# Patient Record
Sex: MALE | Race: WHITE
[De-identification: names, ages, dates, MRNs, and addresses within clinical notes are randomized per-mention and may not be internally consistent; named-entity substitution may affect disease eponyms.]

---

## 2019-10-19 ENCOUNTER — HOSPITAL ENCOUNTER (EMERGENCY)
Dept: HOSPITAL 47 - EC | Age: 44
Discharge: HOME | End: 2019-10-19
Payer: SELF-PAY

## 2019-10-19 VITALS
SYSTOLIC BLOOD PRESSURE: 122 MMHG | HEART RATE: 66 BPM | RESPIRATION RATE: 16 BRPM | TEMPERATURE: 98 F | DIASTOLIC BLOOD PRESSURE: 83 MMHG

## 2019-10-19 DIAGNOSIS — Z79.899: ICD-10-CM

## 2019-10-19 DIAGNOSIS — T15.92XA: Primary | ICD-10-CM

## 2019-10-19 DIAGNOSIS — F32.9: ICD-10-CM

## 2019-10-19 PROCEDURE — 99283 EMERGENCY DEPT VISIT LOW MDM: CPT

## 2019-10-19 PROCEDURE — 65205 REMOVE FOREIGN BODY FROM EYE: CPT

## 2019-10-19 NOTE — ED
Eye Problem HPI





- General


Chief complaint: Eye Problems


Stated complaint: FB in eye


Time Seen by Provider: 10/19/19 09:30


Source: patient, RN notes reviewed


Mode of arrival: ambulatory


Limitations: no limitations





- History of Present Illness


Initial comments: 





43-year-old male presents emergency Department with chief complaint of left eye 

irritation.  Patient states she is cleaning yesterday and states that he had 

something in his left eye.  His tetanus is up-to-date last 5 years.  He states 

he tried to flush his eye woke up and stilts had some discomfort.  Denies any 

blurred vision, double vision.  Patient denies any other complaints at this 

time.





- Related Data


                                Home Medications











 Medication  Instructions  Recorded  Confirmed


 


PARoxetine HCL [Paxil] 30 mg PO DAILY 08/29/16 08/29/16








                                  Previous Rx's











 Medication  Instructions  Recorded


 


Ondansetron Odt [Zofran Odt] 4 mg PO Q8HR PRN #10 tab 12/05/17











                                    Allergies











Allergy/AdvReac Type Severity Reaction Status Date / Time


 


No Known Allergies Allergy   Verified 10/19/19 09:27














Review of Systems


ROS Statement: 


Those systems with pertinent positive or pertinent negative responses have been 

documented in the HPI.





ROS Other: All systems not noted in ROS Statement are negative.





Past Medical History


Past Medical History: No Reported History


Additional Past Medical History / Comment(s): SVT


History of Any Multi-Drug Resistant Organisms: None Reported


Past Surgical History: Ablation


Past Psychological History: Depression


Smoking Status: Never smoker


Past Alcohol Use History: None Reported


Past Drug Use History: None Reported





General Exam


Limitations: no limitations


General appearance: alert, in no apparent distress


Head exam: Present: atraumatic, normocephalic, normal inspection


Eye exam: Present: PERRL, EOMI.  Absent: normal appearance (Foreign body noted 

in the 7 o'clock position), scleral icterus, conjunctival injection, periorbital

swelling


ENT exam: Present: normal exam, normal oropharynx, mucous membranes moist


Neck exam: Present: normal inspection, full ROM.  Absent: tenderness, 

meningismus, lymphadenopathy


Respiratory exam: Present: normal lung sounds bilaterally.  Absent: respiratory 

distress, wheezes, rales, rhonchi, stridor


Cardiovascular Exam: Present: regular rate, normal rhythm, normal heart sounds. 

Absent: systolic murmur, diastolic murmur, rubs, gallop, clicks


Neurological exam: Present: alert, oriented X3


Skin exam: Present: warm, dry, intact, normal color.  Absent: rash





Course


                                   Vital Signs











  10/19/19





  09:26


 


Temperature 98.0 F


 


Pulse Rate 66


 


Respiratory 16





Rate 


 


Blood Pressure 122/83


 


O2 Sat by Pulse 99





Oximetry 














Procedures





- Procedures


Initial comment: 





Left eye foreign body removal, 2 drops of proparacaine were used to anesthetize 

eye for relief of symptoms, stroke in Q-tip was used to remove the foreign body 

with no compilations fluorescein dye were used there is uptake in a region or 

foreign body was removed otherwise no uptake





Disposition


Clinical Impression: 


 Foreign body of left eye





Disposition: HOME SELF-CARE


Condition: Stable


Instructions (If sedation given, give patient instructions):  Eye Foreign Body 

(ED)


Additional Instructions: 


Please return to the Emergency Department if symptoms worsen or any other 

concerns.  Use Tobrex eyedrops 1 drop to left eye every 4 hours for 7 days


Is patient prescribed a controlled substance at d/c from ED?: No


Referrals: 


Usman West MD [STAFF PHYSICIAN] - 1-2 days


Time of Disposition: 09:51

## 2020-02-06 ENCOUNTER — HOSPITAL ENCOUNTER (EMERGENCY)
Dept: HOSPITAL 47 - EC | Age: 45
Discharge: HOME | End: 2020-02-06
Payer: COMMERCIAL

## 2020-02-06 VITALS
SYSTOLIC BLOOD PRESSURE: 149 MMHG | TEMPERATURE: 98.2 F | RESPIRATION RATE: 20 BRPM | HEART RATE: 88 BPM | DIASTOLIC BLOOD PRESSURE: 89 MMHG

## 2020-02-06 DIAGNOSIS — F43.21: Primary | ICD-10-CM

## 2020-02-06 DIAGNOSIS — Z79.899: ICD-10-CM

## 2020-02-06 DIAGNOSIS — F41.9: ICD-10-CM

## 2020-02-06 PROCEDURE — 82075 ASSAY OF BREATH ETHANOL: CPT

## 2020-02-06 PROCEDURE — 99284 EMERGENCY DEPT VISIT MOD MDM: CPT

## 2020-02-06 NOTE — ED
General Adult HPI





- General


Chief complaint: Psychiatric Symptoms


Stated complaint: Depression


Time Seen by Provider: 02/06/20 17:44


Source: patient, RN notes reviewed, old records reviewed


Mode of arrival: ambulatory


Limitations: no limitations





- History of Present Illness


Initial comments: 





Patient is a 44 rolled male, who presents emergency department today with 

suicidal ideation suppression.  States he sees his primary care doctor who 

manages him on mood stabilizers.  Does not see a psychiatrist or counselor at 

this time.  He states like just is not worth living anymore.  Will not give 

further detail.  Denies any recent suicide attempts including overdoses or 

inflicted self-harm.





- Related Data


                                Home Medications











 Medication  Instructions  Recorded  Confirmed


 


PARoxetine HCL [Paxil] 30 mg PO DAILY 08/29/16 08/29/16








                                  Previous Rx's











 Medication  Instructions  Recorded


 


Ondansetron Odt [Zofran Odt] 4 mg PO Q8HR PRN #10 tab 12/05/17











                                    Allergies











Allergy/AdvReac Type Severity Reaction Status Date / Time


 


No Known Allergies Allergy   Verified 02/06/20 17:22














Review of Systems


ROS Statement: 


Those systems with pertinent positive or pertinent negative responses have been 

documented in the HPI.





ROS Other: All systems not noted in ROS Statement are negative.





Past Medical History


Past Medical History: No Reported History


Additional Past Medical History / Comment(s): SVT


History of Any Multi-Drug Resistant Organisms: None Reported


Past Surgical History: Ablation


Past Psychological History: Anxiety, Depression


Smoking Status: Never smoker


Past Alcohol Use History: None Reported


Past Drug Use History: None Reported





General Exam





- General Exam Comments


Initial Comments: 





44-year-old male.  Alert and oriented 3.  Patient appears in no significant 

distress.


Limitations: no limitations


General appearance: alert, in no apparent distress


Head exam: Present: atraumatic, normocephalic, normal inspection


Eye exam: Present: normal appearance, PERRL, EOMI.  Absent: scleral icterus, 

conjunctival injection, periorbital swelling


ENT exam: Present: normal exam, mucous membranes moist


Neck exam: Present: normal inspection


Respiratory exam: Present: normal lung sounds bilaterally.  Absent: respiratory 

distress, wheezes, rales, rhonchi, stridor


Cardiovascular Exam: Present: regular rate, normal rhythm, normal heart sounds. 

Absent: systolic murmur, diastolic murmur, rubs, gallop, clicks


Extremities exam: Present: normal inspection, full ROM, normal capillary refill.

 Absent: tenderness, pedal edema, joint swelling, calf tenderness


Back exam: Present: normal inspection


Neurological exam: Present: alert, oriented X3, CN II-XII intact


Psychiatric exam: Present: other (Patient appears somewhat angry, and hostile. 

Non talkative. ).  Absent: normal affect, normal mood


Skin exam: Present: warm, dry, intact, normal color.  Absent: rash





Course


                                   Vital Signs











  02/06/20 02/06/20 02/06/20





  17:19 17:22 18:22


 


Temperature 98.2 F  


 


Pulse Rate 88  


 


Respiratory 20 20 20





Rate   


 


Blood Pressure 149/89  


 


O2 Sat by Pulse 99  





Oximetry   














Medical Decision Making





- Medical Decision Making





44-year-old male presents with finger, and reports that he "has not until 4".  

He would not tell me any full details and denied any specific suicidal plans to 

me.  The Patient is medically clear for EPS evaluation.  Patient was evaluated 

by EPS nurse thought to have an extensive conversation with Patient.  She was 

informed the Patient underwent a divorce and has been having some trouble 

maintaining contact with his daughters and nonbiological son whom he still is 

wanting to  remain in contact with.  Patient is maintained on Effexor.  After 

EPS evaluation, Patient at this time does agree to safety plan and will be 

discharged home.  Patient is given outpatient resources.





Disposition


Clinical Impression: 


 Situational depression





Disposition: HOME SELF-CARE


Condition: Good


Instructions (If sedation given, give patient instructions):  Depression (ED), 

Suicide Prevention (ED)


Additional Instructions: 


Patient advised to follow up with outpatient resources.  Recommended discussing 

with primary care physician as well.  Return to the emergency department if any 

alarming signs or symptoms occur.


Is patient prescribed a controlled substance at d/c from ED?: No


Referrals: 


Bert Coker MD [Primary Care Provider] - 1-2 days


Time of Disposition: 19:45

## 2020-05-01 ENCOUNTER — HOSPITAL ENCOUNTER (EMERGENCY)
Dept: HOSPITAL 47 - EC | Age: 45
Discharge: HOME | End: 2020-05-01
Payer: COMMERCIAL

## 2020-05-01 VITALS
DIASTOLIC BLOOD PRESSURE: 89 MMHG | SYSTOLIC BLOOD PRESSURE: 136 MMHG | HEART RATE: 102 BPM | TEMPERATURE: 99 F | RESPIRATION RATE: 18 BRPM
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
## 2020-05-01 DIAGNOSIS — Z23: ICD-10-CM

## 2020-05-01 DIAGNOSIS — F41.9: ICD-10-CM

## 2020-05-01 DIAGNOSIS — Z79.899: ICD-10-CM

## 2020-05-01 DIAGNOSIS — F32.9: ICD-10-CM
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
## 2020-05-01 DIAGNOSIS — S61.211A: Primary | ICD-10-CM

## 2020-05-01 PROCEDURE — 73120 X-RAY EXAM OF HAND: CPT

## 2020-05-01 PROCEDURE — 99283 EMERGENCY DEPT VISIT LOW MDM: CPT

## 2020-05-01 PROCEDURE — 12001 RPR S/N/AX/GEN/TRNK 2.5CM/<: CPT

## 2020-05-01 PROCEDURE — 90471 IMMUNIZATION ADMIN: CPT

## 2020-05-01 PROCEDURE — 90715 TDAP VACCINE 7 YRS/> IM: CPT

## 2020-05-01 NOTE — XR
EXAMINATION TYPE: XR hand limited LT, 2 views

 

DATE OF EXAM: 5/1/2020

 

COMPARISON: NONE

 

HISTORY: 44-year-old male index finger laceration, pain

 

TECHNIQUE: 2 views

 

FINDINGS: There are small defect involving the radial margin of the second distal phalangeal tuft on 
the AP view. No retained radiopaque foreign body or otherwise any other fracture or dislocation seen.


 

IMPRESSION: 

Small defect involving the radial margin of the second distal phalangeal tuft on the AP view, possibl
e traumatic defect. In the absence of penetrating injury, congenital variation would be suggested. Cl
inically correlate. No retained radiopaque foreign body seen.

## 2020-05-01 NOTE — ED
Wound/Laceration HPI





- General


Chief Complaint: Wound/Laceration


Stated Complaint: Finger laceration


Time Seen by Provider: 05/01/20 18:46


Source: patient


Mode of arrival: ambulatory


Limitations: no limitations





- History of Present Illness


Initial Comments: 





Patient is a 44-year-old male presenting to the emergency Department with 

complaints of a laceration to his left index finger.  Patient states he was 

using a drill when it slipped and he cut his finger.  He denies being on blood 

thinners.  Bleeding is controlled with a bandage at this time.  Patient did put 

some sort of skin glue on top of the injury prior to arrival.  He does not 

remember his last tetanus vaccine.  Denies fever or chills.  He has no other 

complaints at this time.





- Related Data


                                Home Medications











 Medication  Instructions  Recorded  Confirmed


 


PARoxetine HCL [Paxil] 30 mg PO DAILY 08/29/16 08/29/16








                                  Previous Rx's











 Medication  Instructions  Recorded


 


Ondansetron Odt [Zofran Odt] 4 mg PO Q8HR PRN #10 tab 12/05/17


 


Cephalexin [Keflex] 500 mg PO BID 3 Days #6 cap 05/01/20











                                    Allergies











Allergy/AdvReac Type Severity Reaction Status Date / Time


 


No Known Allergies Allergy   Verified 05/01/20 18:45














Review of Systems


ROS Statement: 


Those systems with pertinent positive or pertinent negative responses have been 

documented in the HPI.





ROS Other: All systems not noted in ROS Statement are negative.





Past Medical History


Past Medical History: No Reported History


Additional Past Medical History / Comment(s): SVT


History of Any Multi-Drug Resistant Organisms: None Reported


Past Surgical History: Ablation


Past Psychological History: Anxiety, Depression


Smoking Status: Never smoker


Past Alcohol Use History: None Reported


Past Drug Use History: None Reported





General Exam





- General Exam Comments


Initial Comments: 





GENERAL: 


Well-appearing, well-nourished and in no acute distress.





HEAD: 


Atraumatic, normocephalic.





EYES:


Pupils equal round and reactive to light, extraocular movements intact, sclera 

anicteric, conjunctiva are normal.





ENT: 


Moist mucous membranes.





NECK: 


Normal range of motion, supple without lymphadenopathy or JVD.





LUNGS:


 Breath sounds clear to auscultation bilaterally and equal.  No wheezes rales or

 rhonchi.





HEART:


Regular rate and rhythm without murmurs, rubs or gallops.





ABDOMEN: 


Soft, nontender, normoactive bowel sounds.  No guarding, no rebound.  No masses 

appreciated.





: Deferred 





EXTREMITIES: 


Patient has full range of motion of the left hand and fingers.  Normal range of 

motion, no pitting or edema.  No clubbing or cyanosis.





NEUROLOGICAL: 


Normal speech, normal gait.





PSYCH:


Normal mood, normal affect.





SKIN:


 Warm, Dry, normal turgor, no rashes.  Patient has a 1 cm laceration to the 

palmar aspect of the distal left index finger.  There is no nail involvement.  

Bleeding is controlled.


Limitations: no limitations





Course


                                   Vital Signs











  05/01/20 05/01/20 05/01/20





  18:42 19:10 19:42


 


Temperature 98.2 F  99.0 F


 


Pulse Rate 120 H  102 H


 


Respiratory 20 20 18





Rate   


 


Blood Pressure 134/85  136/89


 


O2 Sat by Pulse 95  98





Oximetry   














Procedures





- Laceration


  ** Laceration #1


Consent Obtained: verbal consent


Indication: laceration


Site: other (Left index finger, distal portion)


Size (cm): 1


Description: linear, irregular


Depth: simple, single layer


Anesthetic Used: lidocaine 1%


Anesthesia Technique: local infiltration


Amount (mls): 2


Pre-repair: irrigated extensively


Type of Sutures: nylon


Size of Sutures: 5-0


Number of Sutures: 2


Technique: simple, interrupted


Patient Tolerated Procedure: well





Medical Decision Making





- Medical Decision Making





Patient is a 44-year-old male with a laceration to his left index finger.  

Tetanus vaccine is updated today.  X-ray reveals a small defect on the second 

distal tuft, possibly congenital.  Patient stated he had previous injury to same

 finger.  Patient's wound was irrigated, closed with 2, 5-0 sutures.  Patient 

tolerated procedure well.  Patient will have stitches removed in 7-10 days.  He 

is stable for discharge.  Patient was placed on antibiotics.  He is agreement 

with this plan and care.  Return parameters were discussed with the patient and 

he verbalized understanding.  Case discussed with Dr. Walters.





Disposition


Clinical Impression: 


 Laceration of left index finger w/o foreign body w/o damage to nail





Disposition: HOME SELF-CARE


Condition: Stable


Instructions (If sedation given, give patient instructions):  Care For Your 

Stitches (ED)


Additional Instructions: 


Please return to the Emergency Department if symptoms worsen or any other 

concerns.


Sutures need to be removed in 7-10 days.  


Keep wound clean and dry.  Cover when working.  


Take antibiotic as prescribed.


Prescriptions: 


Cephalexin [Keflex] 500 mg PO BID 3 Days #6 cap


Is patient prescribed a controlled substance at d/c from ED?: No


Referrals: 


Bert Coker MD [Primary Care Provider] - 1-2 days

## 2020-09-28 ENCOUNTER — HOSPITAL ENCOUNTER (EMERGENCY)
Dept: HOSPITAL 47 - EC | Age: 45
Discharge: OTHER INSTITUTION | End: 2020-09-28
Payer: COMMERCIAL

## 2020-09-28 VITALS
DIASTOLIC BLOOD PRESSURE: 72 MMHG | TEMPERATURE: 97 F | SYSTOLIC BLOOD PRESSURE: 136 MMHG | RESPIRATION RATE: 32 BRPM | HEART RATE: 94 BPM

## 2020-09-28 DIAGNOSIS — R06.83: ICD-10-CM

## 2020-09-28 DIAGNOSIS — S60.511A: ICD-10-CM

## 2020-09-28 DIAGNOSIS — S60.512A: ICD-10-CM

## 2020-09-28 DIAGNOSIS — R40.2430: ICD-10-CM

## 2020-09-28 DIAGNOSIS — S01.01XA: Primary | ICD-10-CM

## 2020-09-28 DIAGNOSIS — Z23: ICD-10-CM
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
## 2020-09-28 DIAGNOSIS — S30.1XXA: ICD-10-CM

## 2020-09-28 DIAGNOSIS — S70.212A: ICD-10-CM
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
## 2020-09-28 DIAGNOSIS — S70.11XA: ICD-10-CM

## 2020-09-28 LAB
ALBUMIN SERPL-MCNC: 5.3 G/DL (ref 3.5–5)
ALP SERPL-CCNC: 79 U/L (ref 38–126)
ALT SERPL-CCNC: 45 U/L (ref 4–49)
AMYLASE SERPL-CCNC: 152 U/L (ref 30–110)
ANION GAP SERPL CALC-SCNC: 23 MMOL/L
APTT BLD: 22.1 SEC (ref 22–30)
AST SERPL-CCNC: 46 U/L (ref 17–59)
BASOPHILS # BLD AUTO: 0 K/UL (ref 0–0.2)
BASOPHILS NFR BLD AUTO: 0 %
BUN SERPL-SCNC: 11 MG/DL (ref 9–20)
CALCIUM SPEC-MCNC: 9.2 MG/DL (ref 8.4–10.2)
CHLORIDE SERPL-SCNC: 104 MMOL/L (ref 98–107)
CK SERPL-CCNC: 252 U/L (ref 55–170)
CO2 BLDA-SCNC: 22 MMOL/L (ref 19–24)
CO2 SERPL-SCNC: 13 MMOL/L (ref 22–30)
EOSINOPHIL # BLD AUTO: 0.8 K/UL (ref 0–0.7)
EOSINOPHIL NFR BLD AUTO: 6 %
ERYTHROCYTE [DISTWIDTH] IN BLOOD BY AUTOMATED COUNT: 5.77 M/UL (ref 4.3–5.9)
ERYTHROCYTE [DISTWIDTH] IN BLOOD: 13.2 % (ref 11.5–15.5)
GLUCOSE SERPL-MCNC: 114 MG/DL (ref 74–99)
HCO3 BLDA-SCNC: 20 MMOL/L (ref 21–25)
HCT VFR BLD AUTO: 52.7 % (ref 39–53)
HGB BLD-MCNC: 16.4 GM/DL (ref 13–17.5)
INR PPP: 0.9 (ref ?–1.2)
LIPASE SERPL-CCNC: 880 U/L
LYMPHOCYTES # SPEC AUTO: 3.9 K/UL (ref 1–4.8)
LYMPHOCYTES NFR SPEC AUTO: 29 %
MCH RBC QN AUTO: 28.4 PG (ref 25–35)
MCHC RBC AUTO-ENTMCNC: 31.1 G/DL (ref 31–37)
MCV RBC AUTO: 91.3 FL (ref 80–100)
MONOCYTES # BLD AUTO: 0.8 K/UL (ref 0–1)
MONOCYTES NFR BLD AUTO: 6 %
NEUTROPHILS # BLD AUTO: 7.4 K/UL (ref 1.3–7.7)
NEUTROPHILS NFR BLD AUTO: 56 %
PCO2 BLDA: 53 MMHG (ref 35–45)
PH BLDA: 7.19 [PH] (ref 7.35–7.45)
PH UR: 5 [PH] (ref 5–8)
PLATELET # BLD AUTO: 331 K/UL (ref 150–450)
PO2 BLDA: 203 MMHG (ref 83–108)
POTASSIUM SERPL-SCNC: 3.8 MMOL/L (ref 3.5–5.1)
PROT SERPL-MCNC: 8.5 G/DL (ref 6.3–8.2)
PT BLD: 9.5 SEC (ref 9–12)
SODIUM SERPL-SCNC: 140 MMOL/L (ref 137–145)
SP GR UR: 1 (ref 1–1.03)
TROPONIN I SERPL-MCNC: <0.012 NG/ML (ref 0–0.03)
UROBILINOGEN UR QL STRIP: <2 MG/DL (ref ?–2)
WBC # BLD AUTO: 13.3 K/UL (ref 3.8–10.6)

## 2020-09-28 PROCEDURE — 82550 ASSAY OF CK (CPK): CPT

## 2020-09-28 PROCEDURE — 36600 WITHDRAWAL OF ARTERIAL BLOOD: CPT

## 2020-09-28 PROCEDURE — 80306 DRUG TEST PRSMV INSTRMNT: CPT

## 2020-09-28 PROCEDURE — 80320 DRUG SCREEN QUANTALCOHOLS: CPT

## 2020-09-28 PROCEDURE — 71260 CT THORAX DX C+: CPT

## 2020-09-28 PROCEDURE — 72129 CT CHEST SPINE W/DYE: CPT

## 2020-09-28 PROCEDURE — 82150 ASSAY OF AMYLASE: CPT

## 2020-09-28 PROCEDURE — 85025 COMPLETE CBC W/AUTO DIFF WBC: CPT

## 2020-09-28 PROCEDURE — 86901 BLOOD TYPING SEROLOGIC RH(D): CPT

## 2020-09-28 PROCEDURE — 96367 TX/PROPH/DG ADDL SEQ IV INF: CPT

## 2020-09-28 PROCEDURE — 83690 ASSAY OF LIPASE: CPT

## 2020-09-28 PROCEDURE — 90471 IMMUNIZATION ADMIN: CPT

## 2020-09-28 PROCEDURE — 80053 COMPREHEN METABOLIC PANEL: CPT

## 2020-09-28 PROCEDURE — 36415 COLL VENOUS BLD VENIPUNCTURE: CPT

## 2020-09-28 PROCEDURE — 85610 PROTHROMBIN TIME: CPT

## 2020-09-28 PROCEDURE — 96375 TX/PRO/DX INJ NEW DRUG ADDON: CPT

## 2020-09-28 PROCEDURE — 74177 CT ABD & PELVIS W/CONTRAST: CPT

## 2020-09-28 PROCEDURE — 99285 EMERGENCY DEPT VISIT HI MDM: CPT

## 2020-09-28 PROCEDURE — 70486 CT MAXILLOFACIAL W/O DYE: CPT

## 2020-09-28 PROCEDURE — 90715 TDAP VACCINE 7 YRS/> IM: CPT

## 2020-09-28 PROCEDURE — 86850 RBC ANTIBODY SCREEN: CPT

## 2020-09-28 PROCEDURE — 86900 BLOOD TYPING SEROLOGIC ABO: CPT

## 2020-09-28 PROCEDURE — 85730 THROMBOPLASTIN TIME PARTIAL: CPT

## 2020-09-28 PROCEDURE — 94002 VENT MGMT INPAT INIT DAY: CPT

## 2020-09-28 PROCEDURE — 96365 THER/PROPH/DIAG IV INF INIT: CPT

## 2020-09-28 PROCEDURE — 72125 CT NECK SPINE W/O DYE: CPT

## 2020-09-28 PROCEDURE — 72132 CT LUMBAR SPINE W/DYE: CPT

## 2020-09-28 PROCEDURE — 84484 ASSAY OF TROPONIN QUANT: CPT

## 2020-09-28 PROCEDURE — 81003 URINALYSIS AUTO W/O SCOPE: CPT

## 2020-09-28 PROCEDURE — 83605 ASSAY OF LACTIC ACID: CPT

## 2020-09-28 PROCEDURE — 82553 CREATINE MB FRACTION: CPT

## 2020-09-28 PROCEDURE — 31500 INSERT EMERGENCY AIRWAY: CPT

## 2020-09-28 PROCEDURE — 82805 BLOOD GASES W/O2 SATURATION: CPT

## 2020-09-28 PROCEDURE — 70450 CT HEAD/BRAIN W/O DYE: CPT
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
## 2020-09-28 NOTE — CT
EXAMINATION TYPE: CT ChestAbdPelvis w con

 

DATE OF EXAM: 9/28/2020

 

COMPARISON: None

 

HISTORY: trauma

Chest pain abdominal pain

CT DLP: 1376 mGycm

Automated exposure control for dose reduction was used.

 

CONTRAST: 

Performed with IV Contrast, patient injected with 100 mL of Isovue 300.

There is airspace consolidation and atelectasis in the posterior lung fields. There is no pneumothora
x. There is endotracheal tube. There is nasogastric tube in the stomach. Heart size is normal. There 
are no hilar masses. There is no mediastinal adenopathy. Thoracic aorta appears intact. There is no e
vidence of aneurysm.

 

Liver and spleen appear normal. Gallbladder appears normal. The stomach is intact. There is no pancre
atic mass.

 

There is no adrenal mass. Kidneys show satisfactory contrast opacification. There is no hydronephrosi
s. There is no retroperitoneal adenopathy. There is Brown catheter in the urinary bladder. There is n
o inguinal hernia. There is no free fluid in the pelvis.

 

There is no mesenteric edema. There is no ascites or free air. There is no pneumothorax. There is no 
sign of a bowel obstruction.

 

The bony pelvis appears intact. Hip joints are intact. Sacroiliac joints appear normal. I see no rib 
fracture. The shoulder joints appear intact. The thoracic and lumbar vertebra have normal alignment. 
There is no compression fracture. Sternum is intact.

 

IMPRESSION:

Bilateral posterior pulmonary infiltrates and atelectasis. No rib fracture seen.

 

No evidence of traumatic injury within the abdomen and pelvis.
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
## 2020-09-28 NOTE — CT
EXAMINATION TYPE: CT facial bones wo con

 

DATE OF EXAM: 9/28/2020

 

COMPARISON: None

 

HISTORY: trauma

 

CT DLP: 661 mGycm

Automated exposure control for dose reduction was used.

 

Images were obtained from the bottom of the mandible to the top of the frontal sinuses without contra
st.

 

The mandibular ring is intact. Temporomandibular joints appear normal. Zygomatic arches appear normal
. The nasal bone appears intact. The globes are symmetric. There is no retro-orbital mass. There is s
oft tissue swelling in the periorbital regions bilaterally and more on the left side. There is soft t
issue swelling around the nose. There is no evidence of a blowout fracture. The maxilla appears intac
t. There is fairly normal aeration of the paranasal sinuses. There is mild mucosal thickening in the 
sphenoid sinus. There is normal aeration of the mastoid sinuses. There is bilateral normal aeration o
f the epitympanic recess bilaterally. Submandibular salivary glands are symmetric.

 

IMPRESSION:

There is periorbital and nasal soft tissue swelling. No fracture seen. Left temporal scalp soft tissu
e swelling.

## 2021-01-13 ENCOUNTER — HOSPITAL ENCOUNTER (OUTPATIENT)
Dept: HOSPITAL 47 - RADCTMAIN | Age: 46
Discharge: HOME | End: 2021-01-13
Attending: FAMILY MEDICINE
Payer: COMMERCIAL

## 2021-01-13 DIAGNOSIS — R59.0: Primary | ICD-10-CM

## 2021-01-13 PROCEDURE — 70491 CT SOFT TISSUE NECK W/DYE: CPT

## 2021-01-14 NOTE — CT
EXAMINATION TYPE: CT soft tissue neck w con

 

DATE OF EXAM: 1/13/2021

 

HISTORY: Goiter

 

COMPARISON: CT cervical spine September 28, 2020

 

CT DLP: 578 mGycm.  Automated Exposure Control for Dose Reduction was Utilized.

 

TECHNIQUE:  CT scan of the neck is performed with IV Contrast, patient injected with 100 mL of Isovue
 300, axial images are obtained, coronal and sagittal reformatted images are reviewed.

 

FINDINGS:

 

Airway: Normal-sized thyroid redemonstrated. No significant change from prior CT.

 

Parotid/submandibular glands:  No gross abnormality seen.

 

Carotid/Vascular Structures: No significant focal plaque or stenosis. 

 

Osseous Structures: Straightening of cervical spine with mild to moderate disc space narrowing and mo
derate spurring at C6-C7 level redemonstrated 

 

Other: Some scattered prominent but subcentimeter lymph nodes throughout the neck bilaterally remain 
present. No definitive new greater than 1 cm neck adenopathy. Findings appear similar to prior CT. Fo
r reference one of larger lymph nodes right submandibular region anterior to carotid and jugular vess
els measures 1.5 x 0.8 cm axial image 63. A left-sided neck lymph node on same image measures 1.5 x 0
.8 cm in similar position.

 

IMPRESSION: Normal-sized thyroid. Airway remains patent. Prominent but subcentimeter bilateral neck a
denopathy redemonstrated, nonspecific finding, no significant change from prior CT cervical spine clotilde
dy.

## 2024-10-03 ENCOUNTER — HOSPITAL ENCOUNTER (EMERGENCY)
Dept: HOSPITAL 47 - EC | Age: 49
Discharge: HOME | End: 2024-10-03
Payer: SELF-PAY

## 2024-10-03 VITALS — HEART RATE: 76 BPM | TEMPERATURE: 98.2 F | DIASTOLIC BLOOD PRESSURE: 80 MMHG | SYSTOLIC BLOOD PRESSURE: 127 MMHG

## 2024-10-03 VITALS — RESPIRATION RATE: 18 BRPM

## 2024-10-03 DIAGNOSIS — L73.1: Primary | ICD-10-CM

## 2024-10-03 PROCEDURE — 99283 EMERGENCY DEPT VISIT LOW MDM: CPT

## 2024-10-03 NOTE — ED
Skin/Abscess/FB HPI





- General


Chief complaint: Skin/Abscess/Foreign Body


Stated complaint: pain on penis


Source: patient, RN notes reviewed


Mode of arrival: ambulatory


Limitations: no limitations





- History of Present Illness


Initial comments: 





This is a 48-year-old male presenting for bump/abscess on underside of penis x 1

day.  Patient states he awoke this morning with a small white bump and states he

is unsure of cause.  Patient states he is unsure if he was stung by a bee 

overnight.  Patient denies concern for STI.  Denies significant localized edema,

erythema, discharge, urethral discharge, vesicles, burning, itching.


Onset/Timin


-: days(s)


Location: genitals


Severity: mild





- Related Data


                                Home Medications











 Medication  Instructions  Recorded  Confirmed


 


PARoxetine HCL [Paxil] 30 mg PO DAILY 16








                                  Previous Rx's











 Medication  Instructions  Recorded


 


Ondansetron Odt [Zofran Odt] 4 mg PO Q8HR PRN #10 tab 17


 


Cephalexin [Keflex] 500 mg PO BID 3 Days #6 cap 20


 


Mupirocin 2% Oint [Bactroban 2% 1 applic TOPICAL TID #22 gm 10/03/24





Oint]  











                                    Allergies











Allergy/AdvReac Type Severity Reaction Status Date / Time


 


No Known Allergies Allergy   Verified 10/03/24 11:24














Review of Systems


ROS Statement: 


Those systems with pertinent positive or pertinent negative responses have been 

documented in the HPI.





ROS Other: All systems not noted in ROS Statement are negative.





Past Medical History


Past Medical History: No Reported History, Unable to Obtain


Additional Past Medical History / Comment(s): SVT


History of Any Multi-Drug Resistant Organisms: None Reported, Unobtainable


Past Surgical History: Ablation, Unable to Obtain


Past Psychological History: Anxiety, Depression, Unable to Obtain


Past Alcohol Use History: None Reported, Unable to Obtain


Past Drug Use History: None Reported, Unable to Obtain





General Exam


Limitations: no limitations


General appearance: alert, in no apparent distress


Head exam: Present: atraumatic, normocephalic, normal inspection


Eye exam: Present: normal appearance, PERRL, EOMI.  Absent: scleral icterus, 

conjunctival injection, periorbital swelling


ENT exam: Present: normal exam, mucous membranes moist


Neck exam: Present: normal inspection.  Absent: tenderness, meningismus, 

lymphadenopathy


Respiratory exam: Present: normal lung sounds bilaterally.  Absent: respiratory 

distress, wheezes, rales, rhonchi, stridor


Cardiovascular Exam: Present: regular rate, normal rhythm, normal heart sounds. 

Absent: systolic murmur, diastolic murmur, rubs, gallop, clicks


GI/Abdominal exam: Present: soft, normal bowel sounds.  Absent: distended, 

tenderness, guarding, rebound, rigid


External exam: Present: other (Positive solitary pustule on underside of shaft 

of penis with mild surrounding erythema.)


Extremities exam: Present: normal inspection, full ROM, normal capillary refill.

 Absent: tenderness, pedal edema, joint swelling, calf tenderness


Back exam: Present: normal inspection


Neurological exam: Present: alert, oriented X3, CN II-XII intact


Psychiatric exam: Present: normal affect, normal mood


Skin exam: Present: warm, dry, intact, normal color.  Absent: rash





Course


                                   Vital Signs











  10/03/24 10/03/24





  11:20 12:36


 


Temperature 97.9 F 98.2 F


 


Pulse Rate 80 76


 


Respiratory 18 18





Rate  


 


Blood Pressure 133/85 127/80


 


O2 Sat by Pulse 98 98





Oximetry  














Medical Decision Making





- Medical Decision Making





Was pt. sent in by a medical professional or institution (, PA, NP, urgent 

care, hospital, or nursing home...) When possible be specific


@ -No


Did you speak to anyone other than the patient for history (EMS, parent, family,

police, friend...)? What history was obtained from this source 


@ -No


Did you review nursing and triage notes (agree or disagree)? Why? 


@ -I reviewed and agree with nursing and triage notes


Were old charts reviewed (outside hosp., previous admission, EMS record, old 

EKG, old radiological studies, urgent care reports/EKG's, nursing home records)?

Report findings 


@ -No old charts were reviewed


Differential Diagnosis (chest pain, altered mental status, abdominal pain women,

abdominal pain men, vaginal bleeding, weakness, fever, dyspnea, syncope, 

headache, dizziness, GI bleed, back pain, seizure, CVA, palpatations, mental h

ealth, musculoskeletal)? 


@ -HSV-2, bee sting, ingrown hair, pustule, chancre, ulceration


EKG interpreted by me (3pts min.).


@ -None done


X-rays interpreted by me (1pt min.).


@ -None done


CT interpreted by me (1pt min.).


@ -None done


U/S interpreted by me (1pt. min.).


@ -None done


What testing was considered but not performed or refused? (CT, X-rays, U/S, 

labs)? Why?


@ -None


What meds were considered but not given or refused? Why?


@ -None


Did you discuss the management of the patient with other professionals 

(professionals i.e. , PA, NP, lab, RT, psych nurse, , , 

teacher, , )? Give summary


@ -No


Was smoking cessation discussed for >3mins.?


@ -No


Was critical care preformed (if so, how long)?


@ -No


Were there social determinants of health that impacted care today? How? 

(Homelessness, low income, unemployed, alcoholism, drug addiction, 

transportation, low edu. Level, literacy, decrease access to med. care, FDC, 

rehab)?


@ -No


Was there de-escalation of care discussed even if they declined (Discuss DNR or 

withdrawal of care, Hospice)? DNR status


@ -No


What co-morbidities impacted this encounter? (DM, HTN, Smoking, COPD, CAD, 

Cancer, CVA, ARF, Chemo, Hep., AIDS, mental health diagnosis, sleep apnea, 

morbid obesity)?


@ -None


Was patient admitted / discharged? Hospital course, mention meds given and 

route, prescriptions, significant lab abnormalities, going to OR and other 

pertinent info.


@ -Discharge.  Area prepped with Betadine and 18-gauge needle used to sundeep 

pustule with purulent discharge expressed.  Area cleaned with water and 

antibacterial soap.  Bacitracin ointment and Band-Aid applied to area.  

Mupirocin ointment sent to pharmacy for further care at home.


Undiagnosed new problem with uncertain prognosis?


@ -No


Drug Therapy requiring intensive monitoring for toxicity (Heparin, Nitro, 

Insulin, Cardizem)?


@ -No


Were any procedures done?


@ -Lanced and drainage of pustule performed.


Diagnosis/symptom?


@-Pustule/ingrown hair penis.


Acute, or Chronic, or Acute on Chronic?


@ -Acute


Uncomplicated (without systemic symptoms) or Complicated (systemic symptoms)?


@ -Uncomplicated


Side effects of treatment?


@ -No


Exacerbation, Progression, or Severe Exacerbation?


@ -No


Poses a threat to life or bodily function? How? (Chest pain, USA, MI, pneumonia,

PE, COPD, DKA, ARF, appy, cholecystitis, CVA, Diverticulitis, Homicidal, 

Suicidal, threat to staff... and all critical care pts)


@ -No





Disposition


Clinical Impression: 


 Ingrown hair, Stephens, Pustule





Disposition: HOME SELF-CARE


Condition: Good


Instructions (If sedation given, give patient instructions):  Abscess Incision 

and Drainage (DC)


Additional Instructions: 


Advised to keep area clean with antibacterial soap and water at least twice 

daily.  Followed by drying area and applying mupirocin ointment.


Prescriptions: 


Mupirocin 2% Oint [Bactroban 2% Oint] 1 applic TOPICAL TID #22 gm


Is patient prescribed a controlled substance at d/c from ED?: No


Referrals: 


None,Stated [Primary Care Provider] - 1-2 days


Time of Disposition: 12:32